# Patient Record
Sex: FEMALE | Race: WHITE | URBAN - METROPOLITAN AREA
[De-identification: names, ages, dates, MRNs, and addresses within clinical notes are randomized per-mention and may not be internally consistent; named-entity substitution may affect disease eponyms.]

---

## 2017-01-19 ENCOUNTER — TELEPHONE (OUTPATIENT)
Dept: SURGERY | Age: 32
End: 2017-01-19

## 2017-01-19 ENCOUNTER — OFFICE VISIT (OUTPATIENT)
Dept: SURGERY | Age: 32
End: 2017-01-19

## 2017-01-19 VITALS
SYSTOLIC BLOOD PRESSURE: 113 MMHG | HEIGHT: 67 IN | RESPIRATION RATE: 20 BRPM | HEART RATE: 88 BPM | BODY MASS INDEX: 24.8 KG/M2 | TEMPERATURE: 96 F | DIASTOLIC BLOOD PRESSURE: 78 MMHG | WEIGHT: 158 LBS

## 2017-01-19 DIAGNOSIS — L02.91 ABSCESS: Primary | ICD-10-CM

## 2017-01-19 DIAGNOSIS — N61.1 BREAST ABSCESS: Primary | ICD-10-CM

## 2017-01-19 RX ORDER — DICLOXACILLIN SODIUM 250 MG/1
500 CAPSULE ORAL
Qty: 40 CAP | Refills: 0 | Status: SHIPPED | OUTPATIENT
Start: 2017-01-19 | End: 2017-01-20 | Stop reason: SDUPTHER

## 2017-01-19 NOTE — PROGRESS NOTES
Tamica Lema is a 32 y.o. female who presents today with   Chief Complaint   Patient presents with    Follow-up     Left Breast pain, redness and swelling. 1. Have you been to the ER, urgent care clinic since your last visit? Hospitalized since your last visit? No    2. Have you seen or consulted any other health care providers outside of the [address] [place] [address] since your last visit? Include any pap smears or colon screening.  No

## 2017-01-19 NOTE — PROGRESS NOTES
Katty Figueroa is a 32 y.o. female who comes into the office today for evaluation of recurrent left nipple abscess. She had a prior lactation associated abscess which was drained by [facility] in early December. She now notes pain and tenderness in the left nipple. She is still lactating    History reviewed. No pertinent past medical history. History reviewed. No pertinent past surgical history. Current Outpatient Prescriptions on File Prior to Visit   Medication Sig Dispense Refill    PNV with Ca No.65-Iron Poly-FA 60 mg iron-1 mg cap Take 1 Tab by mouth daily.  methylPREDNISolone (MEDROL DOSEPACK) 4 mg tablet        No current facility-administered medications on file prior to visit.         Allergies   Allergen Reactions    Gluten Other (comments)       Social History   Substance Use Topics    Smoking status: Never Smoker    Smokeless tobacco: Never Used    Alcohol use No       Family History   Problem Relation Age of Onset    Hypertension Mother              ROS, positive where in bold:    General: fevers, chills, night sweats, fatigue, weight loss, weight gain    GI: abdominal pain, nausea, vomiting, change in bowel habits, hematochezia, melena, GERD    Integumentary: dermatitis or abnormal moles    HEENT:  visual changes, vertigo, epistaxis, dysphagia, hoarseness    Cardiac: chest pain, palpitations, hypertension, edema,  varicosities    Resp:  cough, shortness of breath, wheezing, hemoptysis, snoring, reactive airway disease    : hematuria, dysuria, frequency, urgency, nocturia, stress urinary incontinence    MSK: weakness, joint pain, arthritis    Endocrine: diabetes, thyroid disease, polyuria, polydipsia, polyphagia, poor wound healing, heat intolerance,cold intolerance    Lymph/Heme: anemia, bruising, history of blood transfusions    Neuro: dizziness, headache, fainting, seizures, stroke    Psychiatry:  Anxiety, depression, psychosis    Physical Exam:  Visit Vitals    /78 (BP 1 Location: Right arm, BP Patient Position: At rest)    Pulse 88    Temp 96 °F (35.6 °C) (Oral)    Resp 20    Ht 5' 7\" (1.702 m)    Wt 71.7 kg (158 lb)    BMI 24.75 kg/m2       General: Well developed, well nourished 32 y.o. female in no acute distress  ENMT: normocephalic, atraumatic mouth:clear, no overt lesions, oral mucosa pink and moist  Neck: supple, no masses, no adenopathy or carotid bruits, trachea midline  Skin: warm, smooth, dry and well perfused  Respiratory: clear to auscultation bilaterally, no wheeze, rhonchi or rales, excursions normal and symmetrical  Cardiovascular: Regular rate and rhythm, no murmurs, clicks, gallops or rubs, no edema or varicosities  Gastrointestinal: soft, nontender, nondistended, normoactive bowel sounds, no hernias, no hepatosplenomegaly,   Musculoskeletal: warm, well-perfused, no tenderness or swelling, normal gait/station  Neuro: sensation and strength grossly intact and symmetrical  Psych: alert and oriented to person, place and time  Breasts: Examined in both the supine and upright positions.   Left nipple with 1cm fluctuant area protruding laterally      Impression:  Nancy Martinez is a 32 y.o. female who has a recurrent left nipple abscess, would benefit from incision and drainage.  -informed consent obtained  -antibiotics   -wound care discussed  -    [address]  OFFICE PROCEDURE PROGRESS NOTE        Chart reviewed for the following:   Brady Al MD, have reviewed the History, Physical and updated the Allergic reactions for [address] performed immediately prior to start of procedure:   Brady Al MD, have performed the following reviews on Nancy Martinez prior to the start of the procedure:            * Patient was identified by name and date of birth   * Agreement on procedure being performed was verified  * Risks and Benefits explained to the patient  * Procedure site verified and marked as necessary  * Patient was positioned for comfort  * Consent was signed and verified     Time: unknonw      Date of procedure: 1/23/2017    Procedure performed by:  Sathya Krishnan MD    Provider assisted by: Francisco Souza, nursing staff    Patient assisted by: self    How tolerated by patient: tolerated the procedure well with no complications    Post Procedural Pain Scale: 0 - No Hurt    Comments: none      Incision/Drainage Procedure Note      Preoperative Diagnosis: abscess, left nipple    Postoperative Diagnosis: abscess, left nipple     Surgeon:  Sathya Krishnan MD     Assistant: Sarah Stone    Anesthesia: Local     Procedure Details: The risks, benefits and alternatives  were explained and consent was obtained for the procedure. The area was prepped and draped in the usual manner. Local anesthesia with 1% lidocaine was infiltrated into the skin overlying the abscess. A stab incision was made. A small amount of pus was evacuated. A culture was obtained. The wound was irrigated with isotonic saline. The wound was packed with iodoform gauze. A sterile dressing was then applied. Estimated Blood Loss:  minimal           Complications:  None; patient tolerated the procedure well.            Condition: stable           Signed By: Sathya Krishnan MD

## 2017-01-20 RX ORDER — DICLOXACILLIN SODIUM 250 MG/1
500 CAPSULE ORAL
Qty: 40 CAP | Refills: 0 | Status: SHIPPED | OUTPATIENT
Start: 2017-01-20

## 2017-01-22 LAB
BACTERIA SPEC AEROBE CULT: NORMAL
GRAM STN SPEC: NORMAL
GRAM STN SPEC: NORMAL

## 2017-01-26 ENCOUNTER — OFFICE VISIT (OUTPATIENT)
Dept: SURGERY | Age: 32
End: 2017-01-26

## 2017-01-26 VITALS
DIASTOLIC BLOOD PRESSURE: 65 MMHG | WEIGHT: 131 LBS | HEIGHT: 67 IN | TEMPERATURE: 96.5 F | HEART RATE: 75 BPM | SYSTOLIC BLOOD PRESSURE: 99 MMHG | RESPIRATION RATE: 20 BRPM | BODY MASS INDEX: 20.56 KG/M2

## 2017-01-26 DIAGNOSIS — N61.1 BREAST ABSCESS: Primary | ICD-10-CM

## 2017-01-26 RX ORDER — SULFAMETHOXAZOLE AND TRIMETHOPRIM 400; 80 MG/1; MG/1
1 TABLET ORAL 2 TIMES DAILY
COMMUNITY

## 2017-02-02 NOTE — PROGRESS NOTES
Subjective:      Trupti Thomas is a 32 y.o. female presents for postop care 1 weeks status post incision and drainage of nipple abscess. Pain is well controlled. She is feeling much better now. She is still pumping on the left and nursing on the right    Objective:     Visit Vitals    BP 99/65 (BP 1 Location: Left arm, BP Patient Position: Sitting)    Pulse 75    Temp 96.5 °F (35.8 °C)    Resp 20    Ht 5' 7\" (1.702 m)    Wt 59.4 kg (131 lb)    BMI 20.52 kg/m2       General:  alert, cooperative, no distress, appears stated age       Incision:   healing well, no drainage, no erythema, no residual infection         Assessment:     Doing well postoperatively. Plan:     - Wound care discussed  - Pt is to increase activities as tolerated.   - followup prn

## 2017-02-27 DIAGNOSIS — L02.91 ABSCESS: ICD-10-CM
